# Patient Record
Sex: FEMALE | Race: WHITE
[De-identification: names, ages, dates, MRNs, and addresses within clinical notes are randomized per-mention and may not be internally consistent; named-entity substitution may affect disease eponyms.]

---

## 2021-09-26 ENCOUNTER — HOSPITAL ENCOUNTER (EMERGENCY)
Dept: HOSPITAL 63 - ER | Age: 56
Discharge: HOME | End: 2021-09-26
Payer: COMMERCIAL

## 2021-09-26 VITALS — HEIGHT: 63 IN | BODY MASS INDEX: 33.2 KG/M2 | WEIGHT: 187.39 LBS

## 2021-09-26 VITALS — DIASTOLIC BLOOD PRESSURE: 70 MMHG | SYSTOLIC BLOOD PRESSURE: 106 MMHG
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
## 2021-09-26 DIAGNOSIS — S06.0X0A: Primary | ICD-10-CM
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
## 2021-09-26 PROCEDURE — 70450 CT HEAD/BRAIN W/O DYE: CPT

## 2021-09-26 PROCEDURE — 72220 X-RAY EXAM SACRUM TAILBONE: CPT

## 2021-09-26 PROCEDURE — 99284 EMERGENCY DEPT VISIT MOD MDM: CPT

## 2021-09-26 NOTE — PHYS DOC
Past History


Past Surgical History:  Cholecystectomy, Hysterectomy, Other


Additional Past Surgical Histo:  knee and back





General Adult


EDM:


Chief Complaint:  MECHANICAL FALL





HPI:


HPI:


56-year-old female presents after mechanical fall.  Patient is a 

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

of a porch.  This caused her to fall backwards she landed on her buttocks and 

then hit the back of her head on concrete.  She was not knocked unconscious.  

She does feel a bit dazed and mildly nauseated.  She has not had vomiting.  She 

feels like she is "a little bit out of it".  She also has an aching sensation at

the top of her buttocks over her sacrum.  She doubts that she broke it but it is

painful.  Patient is not any blood thinners or aspirin.  She does not drink 

alcohol.  She has no blood clotting disorders.  She denies any other injuries or

complaints at this time.





Review of Systems:


Review of Systems:


Constitutional:  Denies fever or chills 


Eyes:  Denies change in visual acuity 


HENT:  Denies nasal congestion or sore throat 


Respiratory:  Denies cough or shortness of breath 


Cardiovascular:  Denies chest pain or edema 


GI:  Denies abdominal pain, nausea, vomiting, bloody stools or diarrhea 


: Denies dysuria 


Musculoskeletal:  Denies back pain or joint pain 


Integument:  Denies rash 


Neurologic:  Denies headache, focal weakness or sensory changes 


Endocrine:  Denies polyuria or polydipsia 


Lymphatic:  Denies swollen glands 


Psychiatric:  Denies depression or anxiety





Physical Exam:


PE:





Constitutional: Well developed, well nourished, no acute distress, non-toxic 

appearance. []


HENT: Normocephalic, atraumatic, bilateral external ears normal, oropharynx 

moist, no oral exudates, nose normal. []


Eyes: PERRLA, EOMI, conjunctiva normal, no discharge. [] 


Neck: Normal range of motion, no tenderness, supple, no stridor. [] 


Cardiovascular:Heart rate regular rhythm, no murmur []


Lungs & Thorax:  Bilateral breath sounds clear to auscultation []


Abdomen: Bowel sounds normal, soft, no tenderness, no masses, no pulsatile 

masses. [] 


Skin: Warm, dry, no erythema, no rash. [] 


Back: No tenderness, no CVA tenderness. [] 


Extremities: No tenderness, no cyanosis, no clubbing, ROM intact, no edema. [] 


Neurologic: Alert and oriented X 3, normal motor function, normal sensory 

function, no focal deficits noted. []


Psychologic: Affect normal, judgement normal, mood normal. []





Current Patient Data:


Vital Signs:





                                   Vital Signs








  Date Time  Temp Pulse Resp B/P (MAP) Pulse Ox O2 Delivery O2 Flow Rate FiO2


 


9/26/21 15:08 98.0 80 16 106/70 (82) 99 Room Air  











EKG:


EKG:


[]





Radiology/Procedures:


Radiology/Procedures:


[]


Impressions:


Exam Date:  9/26/2021 3:49 PM





CT HEAD/BRAIN WO





Indication: Reason: fall from step onto concrete / Spl. Instructions:  / 

History: .





TECHNIQUE:  Head CT was performed without intravenous contrast.  One or more of 

the following dose reduction techniques were utilized:


*Automated exposure control (AEC)


*Adjustment of mA and/or kV according to patient size


*Use of iterative reconstruction technique


*CT scan done according to ALARA, or NEGRO/IMAGE GENTLY





FINDINGS: 





The ventricles and sulci are normal for the patient's stated age.   There is no 

evidence of acute intracranial hemorrhage, extra-axial collection, mass effect, 

midline shift, or acute territorial infarct. No lesion of the skull base or the 

calvarium is seen. The visualized paranasal sinuses, mastoid air cells and orbit

s are normal in appearance. 





IMPRESSION: 





No evidence for acute intracranial abnormality.  











Electronically signed by: Treasure Rowe MD (9/26/2021 4:12 PM) [facility]














DICTATED AND SIGNED BY:     TREASURE ROWE MD


DATE:     09/26/21 1611





CC: COURTNEY GARCIA DO; NON,STAFF ~MTH0 0











XR SACRUM AND COCCYX 2+VIEWS 9/26/2021 3:49 PM





INDICATION: Fall





COMPARISON: None available.





TECHNIQUE:  3 views of the sacrum and coccyx.





FINDINGS/


IMPRESSION:


There is no acute fracture or dislocation. Joint spaces are maintained. Bone 

mineralization is within normal limits. Regional soft tissues are within normal 

limits. There is no soft tissue gas or osseous erosion. No radiopaque foreign 

body. Moderate lumbar spondylosis.





Electronically signed by: Mimi Snow MD (9/26/2021 4:13 PM) [facility]














DICTATED AND SIGNED BY:     MIMI SNOW MD


DATE:     09/26/21 1612





CC: COURTNEY GARCIA DO; NON,STAFF ~MTH0 0





Heart Score:


C/O Chest Pain:  N/A


Risk Factors:


Risk Factors:  DM, Current or recent (<one month) smoker, HTN, HLP, family 

history of CAD, obesity.


Risk Scores:


Score 0 - 3:  2.5% MACE over next 6 weeks - Discharge Home


Score 4 - 6:  20.3% MACE over next 6 weeks - Admit for Clinical Observation


Score 7 - 10:  72.7% MACE over next 6 weeks - Early Invasive Strategies





Course & Med Decision Making:


Course & Med Decision Making


Pertinent Labs and Imaging studies reviewed. (See chart for details)


The patient's head CT is negative for acute findings.  Her sacrum x-ray is 

negative for acute findings.  The patient may have a mild concussion.  I have 

discussed this with her and told her that it would just take time to see how she

 does.  She states verbal understanding.  She is stable for discharge at this 

time.


[]





Dragon Disclaimer:


Dragon Disclaimer:


This electronic medical record was generated, in whole or in part, using a voice

 recognition dictation system.





Departure


Departure:


Impression:  


   Primary Impression:  


   Fall from stairs


   Additional Impression:  


   Concussion


   Qualified Codes:  S06.0X0A - Concussion without loss of consciousness, 

   initial encounter


Disposition:  01 HOME / SELF CARE / HOMELESS


Condition:  STABLE


Referrals:  


NON,STAFF (PCP)


Patient Instructions:  Concussion and Brain Injury, Easy-to-Read











COURTNEY GARCIA DO                 Sep 26, 2021 15:33

## 2021-09-26 NOTE — RAD
Exam Date:  9/26/2021 3:49 PM



CT HEAD/BRAIN WO



Indication: Reason: fall from step onto concrete / Spl. Instructions:  / History: .



TECHNIQUE:  Head CT was performed without intravenous contrast.  One or more of the following dose re
duction techniques were utilized:

*Automated exposure control (AEC)

*Adjustment of mA and/or kV according to patient size

*Use of iterative reconstruction technique

*CT scan done according to ALARA, or ALARA/IMAGE GENTLY



FINDINGS: 



The ventricles and sulci are normal for the patient's stated age.   There is no evidence of acute int
racranial hemorrhage, extra-axial collection, mass effect, midline shift, or acute territorial infarc
t. No lesion of the skull base or the calvarium is seen. The visualized paranasal sinuses, mastoid ai
r cells and orbits are normal in appearance. 



IMPRESSION: 



No evidence for acute intracranial abnormality.  







Electronically signed by: Frederick Rowe MD (9/26/2021 4:12 PM) [facility]

## 2021-09-26 NOTE — RAD
XR SACRUM AND COCCYX 2+VIEWS 9/26/2021 3:49 PM



INDICATION: Fall



COMPARISON: None available.



TECHNIQUE:  3 views of the sacrum and coccyx.



FINDINGS/

IMPRESSION:

There is no acute fracture or dislocation. Joint spaces are maintained. Bone mineralization is within
 normal limits. Regional soft tissues are within normal limits. There is no soft tissue gas or osseou
s erosion. No radiopaque foreign body. Moderate lumbar spondylosis.



Electronically signed by: Anh Kilgore MD (9/26/2021 4:13 PM) [facility]